# Patient Record
Sex: FEMALE | Race: OTHER | URBAN - METROPOLITAN AREA
[De-identification: names, ages, dates, MRNs, and addresses within clinical notes are randomized per-mention and may not be internally consistent; named-entity substitution may affect disease eponyms.]

---

## 2020-07-20 ENCOUNTER — HOSPITAL ENCOUNTER (EMERGENCY)
Facility: HOSPITAL | Age: 30
Discharge: HOME | End: 2020-07-20
Attending: PEDIATRICS
Payer: COMMERCIAL

## 2020-07-20 VITALS
HEART RATE: 77 BPM | BODY MASS INDEX: 24.55 KG/M2 | TEMPERATURE: 99 F | WEIGHT: 162 LBS | HEIGHT: 68 IN | SYSTOLIC BLOOD PRESSURE: 120 MMHG | DIASTOLIC BLOOD PRESSURE: 80 MMHG | RESPIRATION RATE: 14 BRPM | OXYGEN SATURATION: 100 %

## 2020-07-20 DIAGNOSIS — U07.1 COVID-19 VIRUS INFECTION: Primary | ICD-10-CM

## 2020-07-20 LAB — SARS-COV-2 RNA RESP QL NAA+PROBE: DETECTED

## 2020-07-20 PROCEDURE — 99283 EMERGENCY DEPT VISIT LOW MDM: CPT

## 2020-07-20 NOTE — ED PROVIDER NOTES
Patient Seen in: [facility] Emergency Department      History   Patient presents with:  Testing    Stated Complaint: fever 2 days ago, wants covid test    HPI    31-year-old female here for COVID-19 testing.   She states she had fever for 24 hours 3 fever for 2 days. Patient is well-appearing and in no respiratory distress and is not hypoxic or tachypneic. Rapid SARS done and is positive. Discussion with patient about results and recommended quarantine and supportive care.   Instructions for COVID 1